# Patient Record
Sex: MALE | Race: WHITE
[De-identification: names, ages, dates, MRNs, and addresses within clinical notes are randomized per-mention and may not be internally consistent; named-entity substitution may affect disease eponyms.]

---

## 2018-05-21 ENCOUNTER — HOSPITAL ENCOUNTER (EMERGENCY)
Dept: HOSPITAL 11 - JP.ED | Age: 33
Discharge: HOME | End: 2018-05-21
Payer: MEDICAID

## 2018-05-21 DIAGNOSIS — M54.31: Primary | ICD-10-CM

## 2018-05-21 PROCEDURE — 96374 THER/PROPH/DIAG INJ IV PUSH: CPT

## 2018-05-21 PROCEDURE — 99283 EMERGENCY DEPT VISIT LOW MDM: CPT

## 2018-05-21 PROCEDURE — 96372 THER/PROPH/DIAG INJ SC/IM: CPT

## 2018-05-21 NOTE — EDM.PDOC
ED HPI GENERAL MEDICAL PROBLEM





- General


Chief Complaint: Back Pain or Injury


Stated Complaint: MEDICAL


Time Seen by Provider: 05/21/18 10:45


Source of Information: Reports: Patient, RN Notes Reviewed


History Limitations: Reports: No Limitations





- History of Present Illness


INITIAL COMMENTS - FREE TEXT/NARRATIVE: 





33-year-old gentleman brought in by EMS services day for new onset of back pain

, he has a history of chronic back pain secondary to lifting injury, the for 

this particular event he lifted the gait on a trailer sudden onset of back pain 

predominately on the right side with pain shooting down into the right foot. 

Uses a combination of Robaxin and naproxen for pain control. Received Toradol 

during his last exacerbation which did not provide any relief





- Related Data


 Allergies











Allergy/AdvReac Type Severity Reaction Status Date / Time


 


No Known Allergies Allergy   Verified 05/21/18 10:45











Home Meds: 


 Home Meds





Methocarbamol [Robaxin] 500 mg PO BID 05/21/18 [History]


Naproxen [Naprosyn] 500 mg PO BID 05/21/18 [History]











Past Medical History


Musculoskeletal History: Reports: Back Pain, Chronic





Social & Family History





- Tobacco Use


Smoking Status *Q: Never Smoker





ED ROS GENERAL





- Review of Systems


Review Of Systems: See Below


Constitutional: Reports: No Symptoms


GI/Abdominal: Reports: No Symptoms


: Reports: No Symptoms


Musculoskeletal: Reports: Back Pain


Neurological: Reports: Numbness, Tingling





ED EXAM,LOWER BACK PAIN/INJURY





- Physical Exam


Exam: See Below


Exam Limited By: No Limitations


General Appearance: Alert, WD/WN, Moderate Distress


Respiratory/Chest: No Respiratory Distress


Back Exam: Decreased Range of Motion, Muscle Spasm, Paraspinal Tenderness.  No: 

CVA Tenderness (R), CVA Tenderness (L), Vertebral Tenderness


Neurological: Straight Leg Raise (R).  No: Straight Leg Raise (L)





Course





- Vital Signs


Last Recorded V/S: 


 Last Vital Signs











Temp  95.9 F   05/21/18 10:43


 


Pulse  84   05/21/18 10:43


 


Resp  18   05/21/18 10:43


 


BP  145/71 H  05/21/18 10:43


 


Pulse Ox  95   05/21/18 10:43














- Orders/Labs/Meds


Orders: 


 Active Orders 24 hr











 Category Date Time Status


 


 Peripheral IV Care [RC] .AS DIRECTED Care  05/21/18 11:14 Active


 


 Sodium Chloride 0.9% [Saline Flush] Med  05/21/18 11:14 Active





 10 ml FLUSH ASDIRECTED PRN   


 


 Peripheral IV Insertion Adult [OM.PC] Urgent Oth  05/21/18 11:14 Ordered








 Medication Orders





Sodium Chloride (Saline Flush)  10 ml FLUSH ASDIRECTED PRN


   PRN Reason: Keep Vein Open








Meds: 


Medications











Generic Name Dose Route Start Last Admin





  Trade Name Freq  PRN Reason Stop Dose Admin


 


Sodium Chloride  10 ml  05/21/18 11:14  





  Saline Flush  FLUSH   





  ASDIRECTED PRN   





  Keep Vein Open   





     





     





     














Discontinued Medications














Generic Name Dose Route Start Last Admin





  Trade Name Freq  PRN Reason Stop Dose Admin


 


Cyclobenzaprine HCl  10 mg  05/21/18 10:48  05/21/18 10:53





  Flexeril  PO  05/21/18 10:49  10 mg





  ONETIME ONE   Administration





     





     





     





     


 


Fentanyl  100 mcg  05/21/18 10:48  05/21/18 10:53





  Sublimaze  IM  05/21/18 10:49  100 mcg





  ONETIME ONE   Administration





     





     





     





     


 


Hydromorphone HCl  1 mg  05/21/18 11:14  





  Dilaudid  IVPUSH  05/21/18 11:15  





  ONETIME ONE   





     





     





     





     


 


Hydromorphone HCl  1 mg  05/21/18 11:56  05/21/18 12:05





  Dilaudid  IM  05/21/18 11:57  1 mg





  ONETIME ONE   Administration





     





     





     





     


 


Ketorolac Tromethamine  60 mg  05/21/18 13:06  05/21/18 13:22





  Toradol  IM  05/21/18 13:07  60 mg





  ONETIME ONE   Administration





     





     





     





     


 


Lorazepam  1 mg  05/21/18 12:20  05/21/18 12:25





  Ativan  PO  05/21/18 12:21  1 mg





  ONETIME ONE   Administration





     





     





     





     














Departure





- Departure


Time of Disposition: 13:52


Disposition: Home, Self-Care 01


Condition: Good


Clinical Impression: 


Sciatic nerve pain


Qualifiers:


 Laterality: right Qualified Code(s): M54.31 - Sciatica, right side








- Discharge Information


Forms:  ED Department Discharge


Additional Instructions: 


Use ibuprofen as needed for baseline pain control, use hydrocodone as needed 

for breakthrough pain, please follow-up with your primary care provider next 3-

5 days for reevaluation consider physical therapy





- My Orders


Last 24 Hours: 


My Active Orders





05/21/18 11:14


Peripheral IV Care [RC] .AS DIRECTED 


Sodium Chloride 0.9% [Saline Flush]   10 ml FLUSH ASDIRECTED PRN 


Peripheral IV Insertion Adult [OM.PC] Urgent 














- Assessment/Plan


Last 24 Hours: 


My Active Orders





05/21/18 11:14


Peripheral IV Care [RC] .AS DIRECTED 


Sodium Chloride 0.9% [Saline Flush]   10 ml FLUSH ASDIRECTED PRN 


Peripheral IV Insertion Adult [OM.PC] Urgent 











Plan: 





Assessment





Acuity = acute on chronic





Site and laterality = right sciatic nerve pain exacerbation





Etiology  = unclear etiology  





Manifestations = none  





Location of injury =  Home





Lab values = none  





Plan


Did get good improvement with combination Toradol, fentanyl, Dilaudid, Ativan, 

Flexeril, plan is discharge home hydrocodone 5/325 one tab by mouth 3 times a 

day when necessary provided total #6 plan is follow-up primary care next 3-5 

days for reevaluation consider physical therapy continue with ibuprofen for 

baseline pain control  

















 This note was dictated using dragon voice recognition software please call 

with any questions on syntax or grammar.